# Patient Record
Sex: MALE | Employment: UNEMPLOYED | URBAN - METROPOLITAN AREA
[De-identification: names, ages, dates, MRNs, and addresses within clinical notes are randomized per-mention and may not be internally consistent; named-entity substitution may affect disease eponyms.]

---

## 2018-12-03 ENCOUNTER — OFFICE VISIT (OUTPATIENT)
Dept: ORTHOPEDIC SURGERY | Age: 33
End: 2018-12-03

## 2018-12-03 VITALS
HEIGHT: 74 IN | SYSTOLIC BLOOD PRESSURE: 131 MMHG | OXYGEN SATURATION: 98 % | DIASTOLIC BLOOD PRESSURE: 88 MMHG | RESPIRATION RATE: 16 BRPM | HEART RATE: 95 BPM | TEMPERATURE: 98.2 F

## 2018-12-03 DIAGNOSIS — M79.671 RIGHT FOOT PAIN: ICD-10-CM

## 2018-12-03 DIAGNOSIS — S92.311A CLOSED DISPLACED FRACTURE OF FIRST METATARSAL BONE OF RIGHT FOOT, INITIAL ENCOUNTER: Primary | ICD-10-CM

## 2018-12-03 RX ORDER — IBUPROFEN 800 MG/1
TABLET ORAL
COMMUNITY

## 2018-12-03 RX ORDER — TRAMADOL HYDROCHLORIDE 50 MG/1
50 TABLET ORAL
Qty: 48 TAB | Refills: 0 | Status: SHIPPED | OUTPATIENT
Start: 2018-12-03

## 2018-12-03 NOTE — PROGRESS NOTES
AMBULATORY PROGRESS NOTE Patient: Alyssa Jones             MRN: [id number]     SSN: [id number] There is no height or weight on file to calculate BMI. YOB: 1985     AGE: 34 y.o. EX: male PCP: No primary care provider on file. IMPRESSION/DIAGNOSIS AND TREATMENT PLAN  
 
DIAGNOSES 1. Closed displaced fracture of first metatarsal bone of right foot, initial encounter 2. Right foot pain Orders Placed This Encounter  [32244] Foot Min 3V  traMADol (ULTRAM) 50 mg tablet Alyssa Jones understands his diagnoses and the proposed plan. I had a lengthy discussion with him regarding his foot. He has a displaced right 1st metatarsal fracture, mid-diaphyseal region. The distal fracture fragment is displaced in the lateral direction by 4 mm. It seems to be interdigitated. There is no abnormal malalignment in the sagittal plane; this is what his x-ray show today, 3 views, right foot, [facility]. He has some swelling to his foot, does not have a positive wrinkle test, but has swelling to his right foot. Plan is to put it in a well-padded posterior splint, reassess him in 1 week with x-rays, and clinical exam to make sure swelling is going down. He understands that he may still require surgical intervention on this right 1st toe metatarsal.  We want to make sure it does not move at this point, so he needs close follow-up, but because of swelling he has, this would certainly preclude any type of surgical intervention at this current time. I explained to him that he may require surgery due to the lateral displacement. Plan: 
 
1) Splint placed in the office today. 2) Cut back on cigarette smoking. 3) Continue activity modification as directed. RTO - 1 week // PLEASE OBTAIN X-RAYS OF: right foot 3 VIEWS  
 HPI AND EXAMINATION Alyssa Jones IS A 34 y.o. male who presents to my outpatient office complaining of right foot pain. Mr. Brooks العلي states that sometime in November, he kicked his right foot against a chair on accident and fractured his foot. He reports that he has not been taking any medications for his pain. He denies any kidney, heart, or lung problems and states he is otherwise healthy. He denies any DM, sickle cell anemia, or marijuana use. The patient smokes half a pack of cigarettes a day. XR imaging has been reviewed with the patient. Possible surgical and non-surgical interventions have been discussed with the patient. Date of injury: 11/2018. Visit Vitals /88 Pulse 95 Temp 98.2 °F (36.8 °C) (Oral) Resp 16 Ht 6' 2\" (1.88 m) SpO2 98% Appearance: Alert, well appearing and pleasant patient who is in no distress, oriented to person, place/time, and who follows commands. Psychiatric: Affect and mood are appropriate. Cardiovascular/Peripheral Vascular: Normal Pulses to each hand and foot Musculoskeletal:  LOCATION:  Right FOOT/ANKLE Integumentary: No rashes, skin patches, wounds, or abrasions to the right or left legs Warm and normal color. No regions of expressible drainage. Moderate swelling to the dorsal forefoot. No wrinkle test at this time due to swelling. Gait: nonambulatory Tenderness: ATFL/CFL Anterolateral ankle ligaments tenderness is not present Anterior Syndesmosis is not present Medial deltoid ligament tenderness is not present Peroneal tendon sheath no swelling 1/2 Metatarsal base tenderness is present Midfoot tenderness is present Achilles tenderness is not present Cuboid tenderness is not present Proximal fibula tenderness: is not present Motor Strength/Tone Exam: Normal to the toes with respect to extension/flexion Sensory Exam:   Intact Normal Sensation to ankle/foot Stability Testing: Peroneal tendon instability : Not tested due to pain, Instability of Ankle/Subtalar: Not tested due to pain, ROM: Decreased ROM noted to ankle Decreased Hindfoot (Subtalar, CC, TN regions) Normal Forefoot toes Contractures: No Achilles or Gastrocnemius Contractures Calf tenderness: Absent for calf or gastrocnemius muscle regions Soft, supple, non tender, non taut lower extremity compartments Alignment: Neutral Hindfoot Wounds/Abrasions:   None present Extremities:   No embolic phenomena to the toes or hands No significant edema to the foot and or toes. Lower extremities are warm and appear well perfused DVT: No evidence of DVT seen on examination at this time No calf swelling, no tenderness to calf muscles Lymphatic:  No Evidence of Lymphedema Vascular: Medial Border of Tibia Region: Edema is not present Pulses: Dorsalis Pedis &  Posterior Tibial Pulses : Palpable yes Varicosities Lower Limbs :  None Neuro: Negative bilateral Straight leg raise (seated position) See Musculoskeletal section for pertinent individual extremity examination No abnormal hand/wrist, foot/ankle, or facial/neck tremors. CHART REVIEW No past medical history on file. Current Outpatient Medications Medication Sig  ibuprofen (MOTRIN) 800 mg tablet Take  by mouth.  traMADol (ULTRAM) 50 mg tablet Take 1 Tab by mouth every six (6) hours as needed for Pain. Max Daily Amount: 200 mg. No current facility-administered medications for this visit. Allergies Allergen Reactions  Vicodin [Hydrocodone-Acetaminophen] Rash No past surgical history on file. Social History Occupational History  Not on file Tobacco Use  Smoking status: Current Every Day Smoker  Smokeless tobacco: Never Used Substance and Sexual Activity  Alcohol use: Not on file  Drug use: Not on file  Sexual activity: Not on file No family history on file. REVIEW OF SYSTEMS : 12/3/2018  ALL BELOW ARE Negative except : SEE HPI Constitutional: Negative for fever, chills and weight loss. Neg Weight Loss Cardiovascular: Negative for chest pain, claudication and leg swelling. SOB, PALACIO Gastrointestinal/Urological: Negative for  pain, N/V/D/C, Blood in stool or urine,dysuria                         Hematuria, Incontinence, pelvic pain Musculoskeletal: see HPI. Neurological: Negative for dizziness and weakness, headaches,Visual Changes             Confusion,  Or Seizures, Psychiatric/Behavioral: Negative for depression, memory loss and substance abuse. Extremities:  Negative for hair changes, rash or skin lesion changes. Hematologic: Negative for Bleeding problems, bruising, pallor or swollen lymph nodes. Peripheral Vascular: No calf pain, vascular vein tenderness to calf pain No calf throbbing, posterior knee throbbing pain DIAGNOSTIC IMAGING No notes on file Please see above section of this report. I have personally reviewed the results of the above study. The interpretation of this study is my professional opinion. Written by Emperatriz Paula, as dictated by Dr. Annamaria Mac. I, Dr. Annamaria Mac, confirm that all documentation is accurate.

## 2018-12-03 NOTE — PATIENT INSTRUCTIONS
Please follow up in 1 week. You are advised to contact us if your condition worsens. Metatarsal Fracture: Care Instructions Your Care Instructions A metatarsal fracture is a break or a thin, hairline crack in one of the metatarsal bones of the foot. This type of fracture usually happens from repeated stress on the bones of the foot. Or it can happen when a person jumps or changes direction quickly and twists his or her foot or ankle the wrong way. 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 Treatment depends on how bad the fracture is and where the fracture is on the bone. You may or may not have had surgery. Your doctor may have put your foot in a cast or splint to keep it stable. You may have been given crutches to use to keep weight off your foot. A metatarsal fracture may take from 6 weeks to several months to heal. It is important to give your foot time to heal completely, so that you do not hurt it again. Do not return to your usual activities until your doctor says you can. Your doctor may suggest that you get physical therapy to help regain strength and range of motion in your foot. You heal best when you take good care of yourself. Eat a variety of healthy foods, and don't smoke. Follow-up care is a key part of your treatment and safety. Be sure to make and go to all appointments, and call your doctor if you are having problems. It is also a good idea to know your test results and keep a list of the medicines you take. How can you care for yourself at home? · Be safe with medicines. Read and follow all instructions on the label. ? If your doctor gave you a prescription medicine for pain, take it as prescribed. ? If you are not taking a prescription pain medicine, ask your doctor if you can take an over-the-counter medicine.  
· Follow your doctor's instructions about how much weight you can put on your foot and when you can go back to your usual activities. If you were given crutches, use them as directed. · Put ice or a cold pack on your foot for 10 to 20 minutes at a time. Try to do this every 1 to 2 hours for the next 3 days (when you are awake) or until the swelling goes down. Put a thin cloth between the ice and your skin. · Prop up your foot on a pillow when you ice it or anytime you sit or lie down for the next 3 days. Try to keep it above the level of your heart. This will help reduce swelling. Cast and splint care · If your foot is in a cast or splint, follow the cast or splint care instructions your doctor gives you. If you have a removable fiberglass walking cast or a splint, do not take it off unless your doctor tells you to. · Keep your cast or splint dry. If you have a removable fiberglass walking cast or a splint, ask your doctor if it is okay to remove it to bathe. Your doctor may want you to keep it on as much as possible. · If you're told to keep your cast or splint on, tape a sheet of plastic to cover it when you bathe. Or ask your doctor about products that can help keep a cast or splint dry. Water under the cast or splint can cause your skin to itch and hurt. · Never cut your cast or stick anything down it to scratch an itch on your leg. When should you call for help? Call your doctor now or seek immediate medical care if: 
  · You have problems with your cast or splint. For example: ? The skin under the cast or splint is burning or stinging. ? The cast or splint feels too tight. ? There is a lot of swelling near the cast or splint. (Some swelling is normal.) ? You have a new fever. ? There is drainage or a bad smell coming from the cast or splint.  
  · You have increased or severe pain.  
  · You have tingling, weakness, or numbness in your foot and toes.  
  · You cannot move your toes.  
  · Your foot turns cold or changes color.  Watch closely for changes in your health, and be sure to contact your doctor if: 
  · The pain does not get better day by day.  
  · You do not get better as expected. Where can you learn more? Go to http://bertin-anna.info/. Enter (742) 2020-075 in the search box to learn more about \"Metatarsal Fracture: Care Instructions. \" Current as of: November 29, 2017 Content Version: 11.8 © 2965-4199 [employer]. Care instructions adapted under license by [employer] (which disclaims liability or warranty for this information). If you have questions about a medical condition or this instruction, always ask your healthcare professional. [address] any warranty or liability for your use of this information.

## 2018-12-10 ENCOUNTER — OFFICE VISIT (OUTPATIENT)
Dept: ORTHOPEDIC SURGERY | Age: 33
End: 2018-12-10

## 2018-12-10 VITALS
SYSTOLIC BLOOD PRESSURE: 138 MMHG | HEART RATE: 80 BPM | OXYGEN SATURATION: 98 % | RESPIRATION RATE: 16 BRPM | TEMPERATURE: 98.7 F | DIASTOLIC BLOOD PRESSURE: 96 MMHG | HEIGHT: 74 IN

## 2018-12-10 DIAGNOSIS — M79.671 RIGHT FOOT PAIN: Primary | ICD-10-CM

## 2018-12-10 DIAGNOSIS — S92.311D CLOSED DISPLACED FRACTURE OF FIRST METATARSAL BONE OF RIGHT FOOT WITH ROUTINE HEALING, SUBSEQUENT ENCOUNTER: ICD-10-CM

## 2018-12-10 DIAGNOSIS — Z01.818 PRE-OP EXAM: ICD-10-CM

## 2018-12-10 RX ORDER — OXYCODONE AND ACETAMINOPHEN 5; 325 MG/1; MG/1
1 TABLET ORAL
Qty: 40 TAB | Refills: 0 | Status: SHIPPED | OUTPATIENT
Start: 2018-12-10

## 2018-12-10 RX ORDER — HYDROCODONE BITARTRATE AND ACETAMINOPHEN 5; 325 MG/1; MG/1
1 TABLET ORAL
Qty: 40 TAB | Refills: 0 | Status: CANCELLED | OUTPATIENT
Start: 2018-12-10

## 2018-12-10 NOTE — H&P
FOOT AND ANKLE HISTORY AND PHYSICAL Patient: Heather Raphael                    MRN: [id number]         SSN: [id number] YOB: 1985                  AGE: 34 y.o. SEX: male Patient scheduled for:  Open reduction internal fixation left first metatarsal fracture, possible bone grafting Date of surgery: 12/14/18 [facility]geon: Dorcas Lilly. MD Barnt 
ANESTHESIA TYPE:  General,  Popliteal block  
  
 
 
PRESCRIPTIONS AND/OR ORDERS PROVIDED DURING H&P: 
 
Orders Placed This Encounter  AMB SUPPLY ORDER  
 CULTURE, URINE  
 [74598] Foot Min 3V  XR CHEST PA LAT  CBC WITH AUTOMATED DIFF  
 METABOLIC PANEL, COMPREHENSIVE  
 PROTHROMBIN TIME + INR  
 URINALYSIS W/ RFLX MICROSCOPIC  DRUG SCREEN UR - W/ CONFIRM  
 NICOTINE AND METABOLITE, QT SERUM, PLASMA, WHOLE BLOOD  EKG, 12 LEAD, INITIAL  oxyCODONE-acetaminophen (PERCOCET) 5-325 mg per tablet  
  
  
  
 
HISTORY:  
 
The patient was seen in the office today for a preoperative history and physical for an upcoming above listed surgery. The patient is a pleasant 34 y.o. male who has a history of right foot pain. Mr. Cohen  states that sometime in November, he kicked his right foot against a chair on accident and fractured his foot. displaced right 1st metatarsal fracture, mid-diaphyseal region. The distal fracture fragment is displaced in the lateral direction by 4 mm. It seems to be interdigitated. There is no abnormal malalignment in the sagittal plane. He has some swelling to his foot, does not have a positive wrinkle test, but has swelling to his right foot. 
  
Due to the current findings, affected activity of daily living and continued pain and discomfort, surgical intervention is indicated.  The alternatives, risks, and complications, including but not limited to infection, blood loss, need for blood transfusion, neurovascular damage, karime-incisional numbness, subcutaneous hematoma, bone fracture, anesthetic complications, DVT, PE, death, RSD, postoperative stiffness and pain, possible surgical scar, delayed healing and nonhealing, reflexive sympathetic dystrophy, damage to blood vessels and nerves, need for more surgery, MI, and stroke, failure of hardware, gait disturbances,  have been discussed. The patient understands and wishes to proceed with surgery. PAST MEDICAL HISTORY:  
 
Past Medical History:  
Diagnosis Date  Body piercing Tongue  Cough CURRENT MEDICATIONS:  
 
Current Outpatient Medications Medication Sig Dispense Refill  oxyCODONE-acetaminophen (PERCOCET) 5-325 mg per tablet Take 1 Tab by mouth three (3) times daily as needed for Pain. Max Daily Amount: 3 Tabs. 40 Tab 0  ibuprofen (MOTRIN) 800 mg tablet Take  by mouth.  traMADol (ULTRAM) 50 mg tablet Take 1 Tab by mouth every six (6) hours as needed for Pain. Max Daily Amount: 200 mg. 48 Tab 0 ALLERGIES:  
 
Allergies Allergen Reactions  Vicodin [Hydrocodone-Acetaminophen] Rash SURGICAL HISTORY:  
 
Past Surgical History:  
Procedure Laterality Date  HX OTHER SURGICAL Right   
 closure of right knee laceration as child SOCIAL HISTORY:  
 
Social History Socioeconomic History  Marital status: UNKNOWN Spouse name: Not on file  Number of children: Not on file  Years of education: Not on file  Highest education level: Not on file Tobacco Use  Smoking status: Current Every Day Smoker Packs/day: 0.25  Smokeless tobacco: Never Used Substance and Sexual Activity  Drug use: No  
 
 
FAMILY HISTORY:  
 
History reviewed. No pertinent family history. REVIEW OF SYSTEMS:  
 
Negative for fevers, chills, chest pain, shortness of breath, weight loss, recent illness General: Negative for fever and chills. No unexpected change in weight. Denies fatigue. No change in appetite. Skin: Negative for rash or itching. HEENT: Negative for congestion, sore throat, neck pain and neck stiffness. No change in vision or hearing. Hasn't noted any enlarged lymph nodes in the neck. Cardiovascular:  Negative for chest pain and palpitations. Has not noted pedal edema. Respiratory: Negative for colds, sinus, hemoptysis, shortness of breath and wheezing. Positive for cough. Gastrointestinal: Negative for nausea and vomiting, rectal bleeding, coffee ground emesis, abdominal pain, diarrhea and constipation. Genitourinary: Negative for dysuria, frequency urgency, or burning on micturition. No flank pain, no foul smelling urine, no difficulty with initiating urination. Hematological: Negative for bleeding or easy bruising. Musculoskeletal: Negative  for arthralgias, back pain or neck pain. Neurological: Negative for dizziness, seizures or syncopal episodes. Denies headaches. Endocrine: Denies excessive thirst.  No heat/cold intolerance. Psychiatric: Negative for depression or insomnia. PHYSICAL EXAMINATION:  
 
VITALS:  
Visit Vitals BP (!) 138/96 Pulse 80 Temp 98.7 °F (37.1 °C) (Oral) Resp 16 Ht 6' 2\" (1.88 m) SpO2 98% GEN:  Well developed, well nourished 34 y.o. male in no acute distress. PSYCH: Alert an oriented to person, place and time. Mood, memory, affect, behavior and judgment normal 
HEENT: Normocephalic and atraumatic. Eyes: Conjunctivae and EOM are normal.Pupils are equal, round, and reactive to light. External ear normal appearance, external nose normal appearing. Mouth/Throat: Oropharynx is clear and moist, able to handle oral secretions w/out difficulty, airway patent NECK: Supple. Normal ROM, No lymphadenopathy. Trachea is midline. No bruising, swelling or deformity RESP: Clear to auscultation bilaterally. No wheezes, rales, rhonchi. Normal effort and breath sounds. No respiratory distress CARDIO: Normal rate, regular rhythm and normal heart sounds. No MGR. ABDOMEN: Soft, non-tender, non-distended, normoactive bowel sounds in all four quadrants. There is no tenderness. There is no rebound and no guarding. BACK: No CVA or spinal tenderness BREAST:  Deferred PELVIC:    Deferred RECTAL:  Deferred :           Deferred EXTREMITIES: EXAMINATION OF:  Right FOOT/ANKLE Integumentary: No rashes, skin patches, wounds, or abrasions to the right or left legs Warm and normal color. No regions of expressible drainage. Moderate swelling to the dorsal forefoot. No wrinkle test at this time due to swelling. Gait: nonambulatory Tenderness: ATFL/CFL Anterolateral ankle ligaments tenderness is not present Anterior Syndesmosis is not present Medial deltoid ligament tenderness is not present Peroneal tendon sheath no swelling 1/2 Metatarsal base tenderness is present Midfoot tenderness is present Achilles tenderness is not present Cuboid tenderness is not present Proximal fibula tenderness: is not present Motor Strength/Tone Exam: Normal to the toes with respect to extension/flexion Sensory Exam:   Intact Normal Sensation to ankle/foot Stability Testing: Peroneal tendon instability : Not tested due to pain, Instability of Ankle/Subtalar: Not tested due to pain, ROM: Decreased ROM noted to ankle Decreased Hindfoot (Subtalar, CC, TN regions) Normal Forefoot toes Contractures: No Achilles or Gastrocnemius Contractures Calf tenderness: Absent for calf or gastrocnemius muscle regions Soft, supple, non tender, non taut lower extremity compartments Alignment: Neutral Hindfoot Wounds/Abrasions:   None present Extremities:   No embolic phenomena to the toes or hands No significant edema to the foot and or toes. Lower extremities are warm and appear well perfused DVT: No evidence of DVT seen on examination at this time No calf swelling, no tenderness to calf muscles Lymphatic:  No Evidence of Lymphedema Vascular: Medial Border of Tibia Region: Edema is not present Pulses: Dorsalis Pedis &  Posterior Tibial Pulses : Palpable yes Varicosities Lower Limbs :  None Neuro: Negative bilateral Straight leg raise (seated position) See Musculoskeletal section for pertinent individual extremity examination No abnormal hand/wrist, foot/ankle, or facial/neck tremors. RADIOGRAPHS & DIAGNOSTIC STUDIES:  
 
X-rays of the right foot reveal a fracture to the first metatarsal 
 
LABS:  
Pending ASSESSMENT:  
 
 
Encounter Diagnoses Name Primary?  Right foot pain Yes  Closed displaced fracture of first metatarsal bone of right foot with routine healing, subsequent encounter  Pre-op exam   
 
 
PLAN:  
 
Again, the alternatives, risks, and complications, as well as expected outcome were discussed. The patient understands and agrees to proceed with the above listed surgery pending completion of preoperative labs and diagnostic studies. Patient has been given Hibiclens wash with instructions and prescriptions and or orders listed above.  
 
Ct Triplett PA-C 
12/10/2018 
9:24 AM

## 2018-12-10 NOTE — PATIENT INSTRUCTIONS
Contact Maxi Nash for surgical scheduling. Metatarsal Fracture: Care Instructions Your Care Instructions A metatarsal fracture is a break or a thin, hairline crack in one of the metatarsal bones of the foot. This type of fracture usually happens from repeated stress on the bones of the foot. Or it can happen when a person jumps or changes direction quickly and twists his or her foot or ankle the wrong way. 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 Treatment depends on how bad the fracture is and where the fracture is on the bone. You may or may not have had surgery. Your doctor may have put your foot in a cast or splint to keep it stable. You may have been given crutches to use to keep weight off your foot. A metatarsal fracture may take from 6 weeks to several months to heal. It is important to give your foot time to heal completely, so that you do not hurt it again. Do not return to your usual activities until your doctor says you can. Your doctor may suggest that you get physical therapy to help regain strength and range of motion in your foot. You heal best when you take good care of yourself. Eat a variety of healthy foods, and don't smoke. Follow-up care is a key part of your treatment and safety. Be sure to make and go to all appointments, and call your doctor if you are having problems. It is also a good idea to know your test results and keep a list of the medicines you take. How can you care for yourself at home? · Be safe with medicines. Read and follow all instructions on the label. ? If your doctor gave you a prescription medicine for pain, take it as prescribed. ? If you are not taking a prescription pain medicine, ask your doctor if you can take an over-the-counter medicine.  
· Follow your doctor's instructions about how much weight you can put on your foot and when you can go back to your usual activities. If you were given crutches, use them as directed. · Put ice or a cold pack on your foot for 10 to 20 minutes at a time. Try to do this every 1 to 2 hours for the next 3 days (when you are awake) or until the swelling goes down. Put a thin cloth between the ice and your skin. · Prop up your foot on a pillow when you ice it or anytime you sit or lie down for the next 3 days. Try to keep it above the level of your heart. This will help reduce swelling. Cast and splint care · If your foot is in a cast or splint, follow the cast or splint care instructions your doctor gives you. If you have a removable fiberglass walking cast or a splint, do not take it off unless your doctor tells you to. · Keep your cast or splint dry. If you have a removable fiberglass walking cast or a splint, ask your doctor if it is okay to remove it to bathe. Your doctor may want you to keep it on as much as possible. · If you're told to keep your cast or splint on, tape a sheet of plastic to cover it when you bathe. Or ask your doctor about products that can help keep a cast or splint dry. Water under the cast or splint can cause your skin to itch and hurt. · Never cut your cast or stick anything down it to scratch an itch on your leg. When should you call for help? Call your doctor now or seek immediate medical care if: 
  · You have problems with your cast or splint. For example: ? The skin under the cast or splint is burning or stinging. ? The cast or splint feels too tight. ? There is a lot of swelling near the cast or splint. (Some swelling is normal.) ? You have a new fever. ? There is drainage or a bad smell coming from the cast or splint.  
  · You have increased or severe pain.  
  · You have tingling, weakness, or numbness in your foot and toes.  
  · You cannot move your toes.  
  · Your foot turns cold or changes color.  Watch closely for changes in your health, and be sure to contact your doctor if: 
  · The pain does not get better day by day.  
  · You do not get better as expected. Where can you learn more? Go to http://bertin-anna.info/. Enter ([phone] 7335-484 in the search box to learn more about \"Metatarsal Fracture: Care Instructions. \" Current as of: November 29, 2017 Content Version: 11.8 © 4254-5927 [employer]. Care instructions adapted under license by [employer] (which disclaims liability or warranty for this information). If you have questions about a medical condition or this instruction, always ask your healthcare professional. [address] any warranty or liability for your use of this information.

## 2018-12-10 NOTE — LETTER
Patient: Rocio Johnson PROCEDURE: ORIF Right First Metatarsal Possible Bone Graft PRE OPERATIVE INSTRUCTIONS: 
Five (5) days prior to surgery STOP taking any hormonal medications, aspirin and/or anti-inflammatory medications. If you are taking blood thinner medication (such as Coumadin, Plavix, Heparin or others) you will need special  instructions from the prescribing physician. Report to the Lab  At [facility] with your enclosed order Form on 12/10/18 You may not eat or drink anything after midnight the night before. Surgery Date: 12/14/18  Time: 9:30am 
Report to [facility] on the First Floor Admitting at: 7:30am 
 
THE DAY OF SURGERY:  
      1. Do not eat, chew gum or drink anything after Midnight prior to the date of your surgery. 2. Take your regular medications with small sips of water unless otherwise instructed. (This means blood pressure and/or heart medicine) If you are insulin dependent, bring your insulin with you, unless otherwise instructed. 3. Bring a list of your medications and the dosage to the hospital including  vitamins. 4. Do not wear nail polish, make-up, jewelry, perfumes or Skin Creams. 5. Do not bring valuables or money to the hospital. 
      6. Must have a responsible adult to accompany you and stay during your surgery so they may drive you home following your surgery and stay with you 24 hours after surgery. Post op visit  appointment is scheduled with Guerline KIMBLE   
   on 12/19/18 @ 1:30pm  at the  [facility] office. Kodi Rocha, Surgery Scheduler  [phone]

## 2018-12-10 NOTE — PROGRESS NOTES
AMBULATORY PROGRESS NOTE Patient: Brandie Sorto             MRN: [id number]     SSN: [id number] There is no height or weight on file to calculate BMI. YOB: 1985     AGE: 34 y.o. EX: male PCP: No primary care provider on file. IMPRESSION/DIAGNOSIS AND TREATMENT PLAN  
 
DIAGNOSES 1. Right foot pain 2. Closed displaced fracture of first metatarsal bone of right foot with routine healing, subsequent encounter 3. Pre-op exam   
 
 
Orders Placed This Encounter  [46386] Foot Min 3V Brandie Sorto understands his diagnoses and the proposed plan. IMPRESSION:  First metatarsal fracture, right side. There is some translation in the coronal plane. There is some slight loss of height. It looks like there is some bony formation. I do not appreciate any significant sagittal plane instability. He arrives here in a splint. It is dirty. He says he has been putting his foot down or at least his heel down. I had a lengthy discussion with him regarding his fracture. Recommendation is for stabilization of the fracture to help minimize the risk of it moving. What we do not tolerate well is any type of sagittal plane instability or sagittal plane malalignment. We can accept some shortening but certainly, we want his first ray to be as anatomic in length and anatomic as possible to help minimize transverse metatarsalgia-type problems as well. The risks of surgery were explained in full detail. These risks include, but are not limited to, bleeding, infection, DVT, PE, death, RSD, karime-incisional numbness, subcutaneous hematoma, post-surgical scar, possible delayed healing, nonhealing, and possible wound complications. The patient voices understanding of the risks of surgery and an informed decision is made to proceed. He understands he must be compliant with postoperative care to minimize the risk for complications. Plan: 1) Continue cutting back on cigarette smoking as directed. 2) DME Order: Short right CAM walker boot. 3) Use cryotherapy as directed. 4) Remain NWB to the RLE in the CAM walker boot. 5) Continue activity modification as directed. 6) Percocet 5 m-2 PO TIDPRN; 40 tablets, 0 refills. The patient states that he has taken Percocet in the past and that it did not cause itching or rash. 7) Pre-Op Labs: CBC w/ diff, CMP, PT INR, CXR, EKG, UA w/ reflex, Urine Culture, UDS, Nicotine level screen. 8) Contact Vi Mclean for surgical scheduling. RTO - after contacting [facility] AND EXAMINATION Greta Welch IS A 34 y.o. male who presents to my outpatient office for follow up of a closed displaced fracture of the first metatarsal bone of the right foot. At the last visit, I prescribed Tramadol 50 mg, placed a splint in the office, instructed the patient to continue activity modification as directed, and to cut back on cigarette smoking. Date of injury: 2018. Since we saw him last, Mr. Marquise Fields states that he is still experiencing a significant amount of pain in his right 1st metatarsal. He reports that he has cut down smoking from half a pack to a quarter of a pack. XR imaging has been reviewed with the patient. Possible surgical interventions have been discussed with the patient. The patient denies any kidney, lung, or heart diseases. He denies h/o DM. Visit Vitals BP (!) 138/96 Pulse 80 Temp 98.7 °F (37.1 °C) (Oral) Resp 16 Ht 6' 2\" (1.88 m) SpO2 98% Appearance: Alert, well appearing and pleasant patient who is in no distress, oriented to person, place/time, and who follows commands. Psychiatric: Affect and mood are appropriate. Cardiovascular/Peripheral Vascular: Normal Pulses to each hand and foot Musculoskeletal:  LOCATION:  Right FOOT/ANKLE Integumentary: No rashes, skin patches, wounds, or abrasions to the right or left legs Warm and normal color. No regions of expressible drainage. Moderate swelling to the dorsal forefoot. No wrinkle test at this time due to swelling. Gait: nonambulatory Tenderness: ATFL/CFL Anterolateral ankle ligaments tenderness is not present Anterior Syndesmosis is not present Medial deltoid ligament tenderness is not present Peroneal tendon sheath no swelling 1/2 Metatarsal base tenderness is present Midfoot tenderness is present  (1st TMT [employer]) Achilles tenderness is not present Cuboid tenderness is not present Proximal fibula tenderness: is not present Motor Strength/Tone Exam: Normal to the toes with respect to extension/flexion Sensory Exam:   Intact Normal Sensation to ankle/foot Stability Testing: Peroneal tendon instability : Not tested due to pain, Instability of Ankle/Subtalar: Not tested due to pain, ROM: Decreased ROM noted to ankle Decreased Hindfoot (Subtalar, CC, TN regions) Normal Forefoot toes Contractures: No Achilles or Gastrocnemius Contractures Calf tenderness: Absent for calf or gastrocnemius muscle regions Soft, supple, non tender, non taut lower extremity compartments Alignment: Neutral Hindfoot Wounds/Abrasions:   None present Extremities:   No embolic phenomena to the toes or hands No significant edema to the foot and or toes. Lower extremities are warm and appear well perfused DVT: No evidence of DVT seen on examination at this time No calf swelling, no tenderness to calf muscles Lymphatic:  No Evidence of Lymphedema Vascular: Medial Border of Tibia Region: Edema is not present Pulses: Dorsalis Pedis &  Posterior Tibial Pulses : Palpable yes Varicosities Lower Limbs :  None Neuro: Negative bilateral Straight leg raise (seated position) See Musculoskeletal section for pertinent individual extremity examination No abnormal hand/wrist, foot/ankle, or facial/neck tremors. CHART REVIEW No past medical history on file. Current Outpatient Medications Medication Sig  ibuprofen (MOTRIN) 800 mg tablet Take  by mouth.  traMADol (ULTRAM) 50 mg tablet Take 1 Tab by mouth every six (6) hours as needed for Pain. Max Daily Amount: 200 mg. No current facility-administered medications for this visit. Allergies Allergen Reactions  Vicodin [Hydrocodone-Acetaminophen] Rash No past surgical history on file. Social History Occupational History  Not on file Tobacco Use  Smoking status: Current Every Day Smoker  Smokeless tobacco: Never Used Substance and Sexual Activity  Alcohol use: Not on file  Drug use: Not on file  Sexual activity: Not on file No family history on file. REVIEW OF SYSTEMS : 12/10/2018  ALL BELOW ARE Negative except : SEE HPI Constitutional: Negative for fever, chills and weight loss. Neg Weight Loss Cardiovascular: Negative for chest pain, claudication and leg swelling. SOB, PALACIO Gastrointestinal/Urological: Negative for  pain, N/V/D/C, Blood in stool or urine,dysuria                         Hematuria, Incontinence, pelvic pain Musculoskeletal: see HPI. Neurological: Negative for dizziness and weakness, headaches,Visual Changes             Confusion,  Or Seizures, Psychiatric/Behavioral: Negative for depression, memory loss and substance abuse. Extremities:  Negative for hair changes, rash or skin lesion changes. Hematologic: Negative for Bleeding problems, bruising, pallor or swollen lymph nodes. Peripheral Vascular: No calf pain, vascular vein tenderness to calf pain No calf throbbing, posterior knee throbbing pain DIAGNOSTIC IMAGING No notes on file Please see above section of this report. I have personally reviewed the results of the above study. The interpretation of this study is my professional opinion. Written by Isabela Phillips, as dictated by Dr. Cindy Lee. I, Dr. Cindy Lee, confirm that all documentation is accurate.

## 2018-12-10 NOTE — LETTER
[address] [facility] Request Form for the Operating Room at [facility] Fax to [phone]                                        Telephone: [phone] or [phone] To be completed by Physician Office: 
 
Date: 12/10/2018    Requested by: Ember Saenz Phone No: [phone]  Fax No:  [phone] Surgery Date: 18    Requested Time: Second Case Surgeon: Richar Hauser MD Assistant/2nd Surgeon: Rosa Godwin PA-C 
 
CPT CODE Procedure 51671 ORIF Right First Metatarsal  
[address]398 Bone Grafting ICD10 code(s): Right First Metatarsal FX [id number] Patient Information: 
 
Name: Birdie Bermudez SSN: [id number]  : 1985  Male/Female: male Home Phone No: [phone] (home) Primary Insurance: Self Pay   Insurance Policy Number:   
Allergies: Allergies Allergen Reactions  Vicodin [Hydrocodone-Acetaminophen] Rash Admission:  Outpatient Anesthesia Type General w/Nerve Block for Post-op Pain Control Comments/Special Equipment and/or :[place] 28 Plate System  LARGE C-Arm   

## 2018-12-12 ENCOUNTER — HOSPITAL ENCOUNTER (OUTPATIENT)
Dept: GENERAL RADIOLOGY | Age: 33
Discharge: HOME | End: 2018-12-12
Payer: SELF-PAY

## 2018-12-12 ENCOUNTER — HOSPITAL ENCOUNTER (OUTPATIENT)
Dept: LAB | Age: 33
Discharge: HOME | End: 2018-12-12
Payer: SELF-PAY

## 2018-12-12 DIAGNOSIS — S92.312A DISPLACED FRACTURE OF FIRST METATARSAL BONE, LEFT FOOT, INITIAL ENCOUNTER FOR CLOSED FRACTURE: Primary | ICD-10-CM

## 2018-12-12 DIAGNOSIS — Z01.818 PRE-OP EXAM: ICD-10-CM

## 2018-12-12 LAB
ALBUMIN SERPL-MCNC: 4.5 G/DL (ref 3.4–5)
ALBUMIN/GLOB SERPL: 1.3 {RATIO} (ref 0.8–1.7)
ALP SERPL-CCNC: 133 U/L (ref 45–117)
ALT SERPL-CCNC: 37 U/L (ref 16–61)
ANION GAP SERPL CALC-SCNC: 8 MMOL/L (ref 3–18)
AST SERPL-CCNC: 34 U/L (ref 15–37)
BASOPHILS # BLD: 0.1 K/UL (ref 0–0.1)
BASOPHILS NFR BLD: 1 % (ref 0–2)
BILIRUB SERPL-MCNC: 0.6 MG/DL (ref 0.2–1)
BUN SERPL-MCNC: 10 MG/DL (ref 7–18)
BUN/CREAT SERPL: 11 (ref 12–20)
CALCIUM SERPL-MCNC: 9.2 MG/DL (ref 8.5–10.1)
CHLORIDE SERPL-SCNC: 101 MMOL/L (ref 100–108)
CO2 SERPL-SCNC: 28 MMOL/L (ref 21–32)
CREAT SERPL-MCNC: 0.89 MG/DL (ref 0.6–1.3)
DIFFERENTIAL METHOD BLD: ABNORMAL
EOSINOPHIL # BLD: 0.7 K/UL (ref 0–0.4)
EOSINOPHIL NFR BLD: 7 % (ref 0–5)
ERYTHROCYTE [DISTWIDTH] IN BLOOD BY AUTOMATED COUNT: 13 % (ref 11.6–14.5)
GLOBULIN SER CALC-MCNC: 3.6 G/DL (ref 2–4)
GLUCOSE SERPL-MCNC: 74 MG/DL (ref 74–99)
HCT VFR BLD AUTO: 46.7 % (ref 36–48)
HGB BLD-MCNC: 15.5 G/DL (ref 13–16)
INR PPP: 0.9 (ref 0.8–1.2)
LYMPHOCYTES # BLD: 2.8 K/UL (ref 0.9–3.6)
LYMPHOCYTES NFR BLD: 25 % (ref 21–52)
MCH RBC QN AUTO: 27.2 PG (ref 24–34)
MCHC RBC AUTO-ENTMCNC: 33.2 G/DL (ref 31–37)
MCV RBC AUTO: 82.1 FL (ref 74–97)
MONOCYTES # BLD: 0.9 K/UL (ref 0.05–1.2)
MONOCYTES NFR BLD: 8 % (ref 3–10)
NEUTS SEG # BLD: 6.6 K/UL (ref 1.8–8)
NEUTS SEG NFR BLD: 59 % (ref 40–73)
PLATELET # BLD AUTO: 286 K/UL (ref 135–420)
PMV BLD AUTO: 8.9 FL (ref 9.2–11.8)
POTASSIUM SERPL-SCNC: 4.6 MMOL/L (ref 3.5–5.5)
PROT SERPL-MCNC: 8.1 G/DL (ref 6.4–8.2)
PROTHROMBIN TIME: 11.8 SEC (ref 11.5–15.2)
RBC # BLD AUTO: 5.69 M/UL (ref 4.7–5.5)
SODIUM SERPL-SCNC: 137 MMOL/L (ref 136–145)
WBC # BLD AUTO: 11 K/UL (ref 4.6–13.2)

## 2018-12-12 PROCEDURE — 80053 COMPREHEN METABOLIC PANEL: CPT

## 2018-12-12 PROCEDURE — 80353 DRUG SCREENING COCAINE: CPT

## 2018-12-12 PROCEDURE — 80323 ALKALOIDS NOS: CPT

## 2018-12-12 PROCEDURE — 85610 PROTHROMBIN TIME: CPT

## 2018-12-12 PROCEDURE — 71046 X-RAY EXAM CHEST 2 VIEWS: CPT

## 2018-12-12 PROCEDURE — 93005 ELECTROCARDIOGRAM TRACING: CPT

## 2018-12-12 PROCEDURE — 82542 COL CHROMOTOGRAPHY QUAL/QUAN: CPT

## 2018-12-12 PROCEDURE — 85025 COMPLETE CBC W/AUTO DIFF WBC: CPT

## 2018-12-12 PROCEDURE — 36415 COLL VENOUS BLD VENIPUNCTURE: CPT

## 2018-12-12 PROCEDURE — 80307 DRUG TEST PRSMV CHEM ANLYZR: CPT

## 2018-12-13 LAB
ATRIAL RATE: 96 BPM
CALCULATED P AXIS, ECG09: 83 DEGREES
CALCULATED R AXIS, ECG10: 78 DEGREES
CALCULATED T AXIS, ECG11: 59 DEGREES
DIAGNOSIS, 93000: NORMAL
P-R INTERVAL, ECG05: 128 MS
Q-T INTERVAL, ECG07: 338 MS
QRS DURATION, ECG06: 86 MS
QTC CALCULATION (BEZET), ECG08: 427 MS
VENTRICULAR RATE, ECG03: 96 BPM

## 2018-12-15 LAB
COTININE SERPL-MCNC: 482.5 NG/ML
NICOTINE SERPL-MCNC: 25.9 NG/ML

## 2018-12-18 LAB
AMPHET UR QL SCN: NEGATIVE
BARBITURATES UR QL SCN: NEGATIVE
BENZODIAZ UR QL: NEGATIVE
BZE UR CFM-MCNC: 4645 NG/ML
BZE UR QL: POSITIVE
CANNABINOIDS UR QL CFM: POSITIVE
CANNABINOIDS UR QL SCN: POSITIVE
COCAINE UR QL SCN: POSITIVE
HDSCOM,HDSCOM: ABNORMAL
METHADONE UR QL: NEGATIVE
OPIATES UR QL: NEGATIVE
PCP UR QL: NEGATIVE
THC UR CFM-MCNC: 114 NG/ML

## 2018-12-19 ENCOUNTER — OFFICE VISIT (OUTPATIENT)
Dept: ORTHOPEDIC SURGERY | Age: 33
End: 2018-12-19

## 2018-12-19 VITALS
TEMPERATURE: 97.9 F | RESPIRATION RATE: 15 BRPM | WEIGHT: 215 LBS | HEART RATE: 76 BPM | SYSTOLIC BLOOD PRESSURE: 125 MMHG | OXYGEN SATURATION: 99 % | HEIGHT: 74 IN | DIASTOLIC BLOOD PRESSURE: 87 MMHG | BODY MASS INDEX: 27.59 KG/M2

## 2018-12-19 DIAGNOSIS — S92.311D CLOSED DISPLACED FRACTURE OF FIRST METATARSAL BONE OF RIGHT FOOT WITH ROUTINE HEALING, SUBSEQUENT ENCOUNTER: ICD-10-CM

## 2018-12-19 DIAGNOSIS — M79.671 RIGHT FOOT PAIN: Primary | ICD-10-CM

## 2018-12-19 NOTE — PROGRESS NOTES
HISTORY:     The patient was seen in the office today for a preoperative history and physical for an upcoming above listed surgery. The patient is a pleasant 34 y.o. male who has a history of right foot pain. Mr. Lamonte Anderson states that sometime in November, he kicked his right foot against a chair on accident and fractured his foot. displaced right 1st metatarsal fracture, mid-diaphyseal region. The distal fracture fragment is displaced in the lateral direction by 4 mm. It seems to be interdigitated. There is no abnormal malalignment in the sagittal plane. He has some swelling to his foot, does not have a positive wrinkle test, but has swelling to his right foot. Patients surgery was initially scheduled for 12/14/18, and was cancelled due to +UDS. Patients x-rays completed in the office today show signs of healing. If alignment is maintained and if healing continues, we will continue to treat fracture non-operatively. We will have patient return on 1/3/19 for repeat x-rays and make final determination at that time. Otherwise, we will proceed with ORIF surgery on 1/4/19.     Due to the current findings, affected activity of daily living and continued pain and discomfort, surgical intervention is indicated. The alternatives, risks, and complications, including but not limited to infection, blood loss, need for blood transfusion, neurovascular damage, karime-incisional numbness, subcutaneous hematoma, bone fracture, anesthetic complications, DVT, PE, death, RSD, postoperative stiffness and pain, possible surgical scar, delayed healing and nonhealing, reflexive sympathetic dystrophy, damage to blood vessels and nerves, need for more surgery, MI, and stroke, failure of hardware, gait disturbances,  have been discussed. The patient understands and wishes to proceed with surgery.      PAST MEDICAL HISTORY:     Past Medical History:   Diagnosis Date    Body piercing     Tongue    Cough        CURRENT MEDICATIONS: Current Outpatient Medications   Medication Sig Dispense Refill    oxyCODONE-acetaminophen (PERCOCET) 5-325 mg per tablet Take 1 Tab by mouth three (3) times daily as needed for Pain. Max Daily Amount: 3 Tabs. 40 Tab 0    ibuprofen (MOTRIN) 800 mg tablet Take  by mouth.  traMADol (ULTRAM) 50 mg tablet Take 1 Tab by mouth every six (6) hours as needed for Pain. Max Daily Amount: 200 mg. 48 Tab 0       ALLERGIES:     Allergies   Allergen Reactions    Vicodin [Hydrocodone-Acetaminophen] Rash         SURGICAL HISTORY:     Past Surgical History:   Procedure Laterality Date    HX OTHER SURGICAL Right     closure of right knee laceration as child       SOCIAL HISTORY:     Social History     Socioeconomic History    Marital status: UNKNOWN     Spouse name: Not on file    Number of children: Not on file    Years of education: Not on file    Highest education level: Not on file   Tobacco Use    Smoking status: Current Every Day Smoker     Packs/day: 0.25    Smokeless tobacco: Never Used   Substance and Sexual Activity    Drug use: No       FAMILY HISTORY:     History reviewed. No pertinent family history. REVIEW OF SYSTEMS:     Negative for fevers, chills, chest pain, shortness of breath, weight loss, recent illness     General: Negative for fever and chills. No unexpected change in weight. Denies fatigue. No change in appetite. Skin: Negative for rash or itching. HEENT: Negative for congestion, sore throat, neck pain and neck stiffness. No change in vision or hearing. Hasn't noted any enlarged lymph nodes in the neck. Cardiovascular:  Negative for chest pain and palpitations. Has not noted pedal edema. Respiratory: Negative for colds, sinus, hemoptysis, shortness of breath and wheezing. Positive for cough. Gastrointestinal: Negative for nausea and vomiting, rectal bleeding, coffee ground emesis, abdominal pain, diarrhea and constipation.    Genitourinary: Negative for dysuria, frequency urgency, or burning on micturition. No flank pain, no foul smelling urine, no difficulty with initiating urination. Hematological: Negative for bleeding or easy bruising. Musculoskeletal: Negative  for arthralgias, back pain or neck pain. Neurological: Negative for dizziness, seizures or syncopal episodes. Denies headaches. Endocrine: Denies excessive thirst.  No heat/cold intolerance. Psychiatric: Negative for depression or insomnia. PHYSICAL EXAMINATION:     VITALS:   Visit Vitals  /87   Pulse 76   Temp 97.9 °F (36.6 °C) (Oral)   Resp 15   Ht 6' 2\" (1.88 m)   Wt 215 lb (97.5 kg)   SpO2 99%   BMI 27.60 kg/m²       GEN:  Well developed, well nourished 34 y.o. male in no acute distress. PSYCH: Alert an oriented to person, place and time. Mood, memory, affect, behavior and judgment normal  HEENT: Normocephalic and atraumatic. Eyes: Conjunctivae and EOM are normal.Pupils are equal, round, and reactive to light. External ear normal appearance, external nose normal appearing. Mouth/Throat: Oropharynx is clear and moist, able to handle oral secretions w/out difficulty, airway patent  NECK: Supple. Normal ROM, No lymphadenopathy. Trachea is midline. No bruising, swelling or deformity  RESP: Clear to auscultation bilaterally. No wheezes, rales, rhonchi. Normal effort and breath sounds. No respiratory distress  CARDIO: Normal rate, regular rhythm and normal heart sounds. No MGR. ABDOMEN: Soft, non-tender, non-distended, normoactive bowel sounds in all four quadrants. There is no tenderness. There is no rebound and no guarding. BACK: No CVA or spinal tenderness  BREAST:  Deferred  PELVIC:    Deferred   RECTAL:  Deferred   :           Deferred  EXTREMITIES: EXAMINATION OF:  Right FOOT/ANKLE  Integumentary: No rashes, skin patches, wounds, or abrasions to the right or left legs                             Warm and normal color. No regions of expressible drainage.                           Moderate swelling to the dorsal forefoot. No wrinkle test at this time due to swelling. Gait: nonambulatory      Tenderness: ATFL/CFL Anterolateral ankle ligaments tenderness is not present                              Anterior Syndesmosis is not present                              Medial deltoid ligament tenderness is not present                              Peroneal tendon sheath no swelling                               1/2 Metatarsal base tenderness is present                              Midfoot tenderness is present                               Achilles tenderness is not present                               Cuboid tenderness is not present                               Proximal fibula tenderness: is not present       Motor Strength/Tone Exam: Normal to the toes with respect to extension/flexion      Sensory Exam:   Intact Normal Sensation to ankle/foot      Stability Testing: Peroneal tendon instability : Not tested due to pain,              Instability of Ankle/Subtalar: Not tested due to pain,         ROM: Decreased ROM noted to ankle                 Decreased Hindfoot (Subtalar, CC, TN regions)                   Normal Forefoot toes        Contractures: No Achilles or Gastrocnemius Contractures      Calf tenderness: Absent for calf or gastrocnemius muscle regions       Soft, supple, non tender, non taut lower extremity compartments       Alignment: Neutral Hindfoot  Wounds/Abrasions:   None present  Extremities:   No embolic phenomena to the toes or hands                          No significant edema to the foot and or toes.                           Lower extremities are warm and appear well perfused                          DVT: No evidence of DVT seen on examination at this time                          No calf swelling, no tenderness to calf muscles  Lymphatic:  No Evidence of Lymphedema  Vascular: Medial Border of Tibia Region: Edema is not present                   Pulses: Dorsalis Pedis &  Posterior Tibial Pulses : Palpable yes                   Varicosities Lower Limbs :  None    Neuro: Negative bilateral Straight leg raise (seated position)               See Musculoskeletal section for pertinent individual extremity examination               No abnormal hand/wrist, foot/ankle, or facial/neck tremors. RADIOGRAPHS & DIAGNOSTIC STUDIES:     X-rays of the right foot reveal a fracture to the first metatarsal, slight healing noted. Alignment is acceptable. LABS:     Pending    ASSESSMENT:       Encounter Diagnosis   Name Primary?  Right foot pain Yes       PLAN:     Again, the alternatives, risks, and complications, as well as expected outcome were discussed. Patients surgery was initially scheduled for 12/14/18, and was cancelled due to +UDS. Patients x-rays completed in the office today show signs of healing. If alignment is maintained and if healing continues, we will continue to treat fracture non-operatively. We will have patient return on 1/3/19 for repeat x-rays and make final determination at that time. Otherwise, we will proceed with ORIF surgery on 1/4/19.     Adonis Joshi PA-C  12/19/2018  9:24 AM

## 2018-12-19 NOTE — PROGRESS NOTES
1. Have you been to the ER, urgent care clinic since your last visit? Hospitalized since your last visit? No    2. Have you seen or consulted any other health care providers outside of the [address] [place] [address] since your last visit? Include any pap smears or colon screening.  No

## 2018-12-19 NOTE — PROCEDURES
X-rays of the right foot reveal a fracture to the first metatarsal, slight healing noted. Alignment is acceptable.

## 2019-01-08 ENCOUNTER — DOCUMENTATION ONLY (OUTPATIENT)
Dept: ORTHOPEDIC SURGERY | Age: 34
End: 2019-01-08

## 2019-01-08 ENCOUNTER — OFFICE VISIT (OUTPATIENT)
Dept: ORTHOPEDIC SURGERY | Age: 34
End: 2019-01-08

## 2019-01-08 VITALS
DIASTOLIC BLOOD PRESSURE: 96 MMHG | TEMPERATURE: 98.5 F | HEIGHT: 74 IN | BODY MASS INDEX: 28.11 KG/M2 | RESPIRATION RATE: 16 BRPM | WEIGHT: 219 LBS | HEART RATE: 111 BPM | SYSTOLIC BLOOD PRESSURE: 149 MMHG | OXYGEN SATURATION: 96 %

## 2019-01-08 DIAGNOSIS — Z01.818 PRE-OPERATIVE EXAMINATION: ICD-10-CM

## 2019-01-08 DIAGNOSIS — S92.311D CLOSED DISPLACED FRACTURE OF FIRST METATARSAL BONE OF RIGHT FOOT WITH ROUTINE HEALING, SUBSEQUENT ENCOUNTER: Primary | ICD-10-CM

## 2019-01-08 NOTE — PROGRESS NOTES
1. Have you been to the ER, urgent care clinic since your last visit? Hospitalized since your last visit? No 
 
2. Have you seen or consulted any other health care providers outside of the [address] [place] [address] since your last visit? Include any pap smears or colon screening.  No

## 2019-01-08 NOTE — PROGRESS NOTES
HISTORY:  
 
The patient was seen in the office today for follow up for a right first metatarsal fracture. Patient presents to the office today for repeat x-rays. X-rays were performed in the office today which reveal good healing. As such, no surgical fixation is warranted at this time. We will allow fracture to continue healing non-operatively. Patients surgery was initially scheduled for 12/14/18, and was cancelled due to +UDS. Patients x-rays completed in the office today show signs of continued healing. Since the alignment is well maintained and healing continues, we will continue to treat fracture non-operatively. Patient understands and agrees with the treatment plan. PAST MEDICAL HISTORY:  
 
Past Medical History:  
Diagnosis Date  Body piercing Tongue  Cough CURRENT MEDICATIONS:  
 
Current Outpatient Medications Medication Sig Dispense Refill  oxyCODONE-acetaminophen (PERCOCET) 5-325 mg per tablet Take 1 Tab by mouth three (3) times daily as needed for Pain. Max Daily Amount: 3 Tabs. 40 Tab 0  ibuprofen (MOTRIN) 800 mg tablet Take  by mouth.  traMADol (ULTRAM) 50 mg tablet Take 1 Tab by mouth every six (6) hours as needed for Pain. Max Daily Amount: 200 mg. 48 Tab 0 ALLERGIES:  
 
Allergies Allergen Reactions  Vicodin [Hydrocodone-Acetaminophen] Rash SURGICAL HISTORY:  
 
Past Surgical History:  
Procedure Laterality Date  HX OTHER SURGICAL Right   
 closure of right knee laceration as child SOCIAL HISTORY:  
 
Social History Socioeconomic History  Marital status: UNKNOWN Spouse name: Not on file  Number of children: Not on file  Years of education: Not on file  Highest education level: Not on file Tobacco Use  Smoking status: Current Every Day Smoker Packs/day: 0.25  Smokeless tobacco: Never Used Substance and Sexual Activity  Drug use: Yes Types: Cocaine, Marijuana Comment: +UDS 12/12/2018 FAMILY HISTORY:  
 
History reviewed. No pertinent family history. REVIEW OF SYSTEMS:  
 
Negative for fevers, chills, chest pain, shortness of breath, weight loss, recent illness General: Negative for fever and chills. No unexpected change in weight. Denies fatigue. No change in appetite. Skin: Negative for rash or itching. HEENT: Negative for congestion, sore throat, neck pain and neck stiffness. No change in vision or hearing. Hasn't noted any enlarged lymph nodes in the neck. Cardiovascular:  Negative for chest pain and palpitations. Has not noted pedal edema. Respiratory: Negative for colds, sinus, hemoptysis, shortness of breath and wheezing. Positive for cough. Gastrointestinal: Negative for nausea and vomiting, rectal bleeding, coffee ground emesis, abdominal pain, diarrhea and constipation. Genitourinary: Negative for dysuria, frequency urgency, or burning on micturition. No flank pain, no foul smelling urine, no difficulty with initiating urination. Hematological: Negative for bleeding or easy bruising. Musculoskeletal: Negative  for arthralgias, back pain or neck pain. Neurological: Negative for dizziness, seizures or syncopal episodes. Denies headaches. Endocrine: Denies excessive thirst.  No heat/cold intolerance. Psychiatric: Negative for depression or insomnia. PHYSICAL EXAMINATION:  
 
VITALS:  
Visit Vitals BP (!) 149/96 Pulse (!) 111 Temp 98.5 °F (36.9 °C) (Oral) Resp 16 Ht 6' 2\" (1.88 m) Wt 219 lb (99.3 kg) SpO2 96% BMI 28.12 kg/m² GEN:  Well developed, well nourished 34 y.o. male in no acute distress. PSYCH: Alert an oriented to person, place and time. Mood, memory, affect, behavior and judgment normal 
HEENT: Normocephalic and atraumatic. Eyes: Conjunctivae and EOM are normal.Pupils are equal, round, and reactive to light. External ear normal appearance, external nose normal appearing.  Mouth/Throat: Oropharynx is clear and moist, able to handle oral secretions w/out difficulty, airway patent NECK: Supple. Normal ROM, No lymphadenopathy. Trachea is midline. No bruising, swelling or deformity RESP: Clear to auscultation bilaterally. No wheezes, rales, rhonchi. Normal effort and breath sounds. No respiratory distress CARDIO: Normal rate, regular rhythm and normal heart sounds. No MGR. ABDOMEN: Soft, non-tender, non-distended, normoactive bowel sounds in all four quadrants. There is no tenderness. There is no rebound and no guarding. BACK: No CVA or spinal tenderness BREAST:  Deferred PELVIC:    Deferred RECTAL:  Deferred :           Deferred EXTREMITIES: EXAMINATION OF:  Right FOOT/ANKLE Integumentary: No rashes, skin patches, wounds, or abrasions to the right or left legs Warm and normal color. No regions of expressible drainage. Moderate swelling to the dorsal forefoot. No wrinkle test at this time due to swelling. Gait: nonambulatory Tenderness: ATFL/CFL Anterolateral ankle ligaments tenderness is not present Anterior Syndesmosis is not present Medial deltoid ligament tenderness is not present Peroneal tendon sheath no swelling 1/2 Metatarsal base tenderness is present Midfoot tenderness is present Achilles tenderness is not present Cuboid tenderness is not present Proximal fibula tenderness: is not present Motor Strength/Tone Exam: Normal to the toes with respect to extension/flexion Sensory Exam:   Intact Normal Sensation to ankle/foot     Stability Testing: Peroneal tendon instability : Not tested due to pain, 
 Instability of Ankle/Subtalar: Not tested due to pain, ROM: Decreased ROM noted to ankle Decreased Hindfoot (Subtalar, CC, TN regions) Normal Forefoot toes Contractures: No Achilles or Gastrocnemius Contractures Calf tenderness: Absent for calf or gastrocnemius muscle regions Soft, supple, non tender, non taut lower extremity compartments Alignment: Neutral Hindfoot Wounds/Abrasions:   None present Extremities:   No embolic phenomena to the toes or hands No significant edema to the foot and or toes. Lower extremities are warm and appear well perfused DVT: No evidence of DVT seen on examination at this time No calf swelling, no tenderness to calf muscles Lymphatic:  No Evidence of Lymphedema Vascular: Medial Border of Tibia Region: Edema is not present Pulses: Dorsalis Pedis &  Posterior Tibial Pulses : Palpable yes Varicosities Lower Limbs :  None Neuro: Negative bilateral Straight leg raise (seated position) See Musculoskeletal section for pertinent individual extremity examination No abnormal hand/wrist, foot/ankle, or facial/neck tremors. RADIOGRAPHS & DIAGNOSTIC STUDIES:  
 
X-rays of the right foot reveal a fracture to the first metatarsal, continued healing noted. Alignment is acceptable. LABS:  
Pending ASSESSMENT:  
 
 
Encounter Diagnoses Name Primary?  Closed displaced fracture of first metatarsal bone of right foot with routine healing, subsequent encounter Yes  Pre-operative examination PLAN:  
 
 
Continue NO WEIGHT on the RIGHT LOWER EXTREMITY Use crutches or knee roller for ambulating Take a Calcium and Vitamin D supplement and maintain good nutrition Follow up in 4 weeks or sooner as needed Cresencio Jeans, PA-C 
 1/8/2019 
9:24 AM

## 2019-01-08 NOTE — PATIENT INSTRUCTIONS
Continue NO WEIGHT on the RIGHT LOWER EXTREMITY Use crutches or knee roller for ambulating Take a Calcium and Vitamin D supplement and maintain good nutrition Follow up in 4 weeks or sooner as needed Eating Healthy Foods: Care Instructions Your Care Instructions Eating healthy foods can help lower your risk for disease. Healthy food gives you energy and keeps your heart strong, your brain active, your muscles working, and your bones strong. A healthy diet includes a variety of foods from the basic food groups: grains, vegetables, fruits, milk and milk products, and meat and beans. Some people may eat more of their favorite foods from only one food group and, as a result, miss getting the nutrients they need. So, it is important to pay attention not only to what you eat but also to what you are missing from your diet. You can eat a healthy, balanced diet by making a few small changes. Follow-up care is a key part of your treatment and safety. Be sure to make and go to all appointments, and call your doctor if you are having problems. It's also a good idea to know your test results and keep a list of the medicines you take. How can you care for yourself at home? Look at what you eat · Keep a food diary for a week or two and record everything you eat or drink. Track the number of servings you eat from each food group. · For a balanced diet every day, eat a variety of: 
? 6 or more ounce-equivalents of grains, such as cereals, breads, crackers, rice, or pasta, every day. An ounce-equivalent is 1 slice of bread, 1 cup of ready-to-eat cereal, or ½ cup of cooked rice, cooked pasta, or cooked cereal. 
? 2½ cups of vegetables, especially: § Dark-green vegetables such as broccoli and spinach. § Orange vegetables such as carrots and sweet potatoes. § Dry beans (such as sanchez and kidney beans) and peas (such as lentils). ? 2 cups of fresh, frozen, or canned fruit. A small apple or 1 banana or orange equals 1 cup. ? 3 cups of nonfat or low-fat milk, yogurt, or other milk products. ? 5½ ounces of meat and beans, such as chicken, fish, lean meat, beans, nuts, and seeds. One egg, 1 tablespoon of peanut butter, ½ ounce nuts or seeds, or ¼ cup of cooked beans equals 1 ounce of meat. · Learn how to read food labels for serving sizes and ingredients. Fast-food and convenience-food meals often contain few or no fruits or vegetables. Make sure you eat some fruits and vegetables to make the meal more nutritious. · Look at your food diary. For each food group, add up what you have eaten and then divide the total by the number of days. This will give you an idea of how much you are eating from each food group. See if you can find some ways to change your diet to make it more healthy. Start small · Do not try to make dramatic changes to your diet all at once. You might feel that you are missing out on your favorite foods and then be more likely to fail. · Start slowly, and gradually change your habits. Try some of the following: ? Use whole wheat bread instead of white bread. ? Use nonfat or low-fat milk instead of whole milk. ? Eat brown rice instead of white rice, and eat whole wheat pasta instead of white-flour pasta. ? Try low-fat cheeses and low-fat yogurt. ? Add more fruits and vegetables to meals and have them for snacks. ? Add lettuce, tomato, cucumber, and onion to sandwiches. ? Add fruit to yogurt and cereal. 
Enjoy food · You can still eat your favorite foods. You just may need to eat less of them. If your favorite foods are high in fat, salt, and sugar, limit how often you eat them, but do not cut them out entirely. · Eat a wide variety of foods. Make healthy choices when eating out · The type of restaurant you choose can help you make healthy choices. Even fast-food chains are now offering more low-fat or healthier choices on the menu. · Choose smaller portions, or take half of your meal home. · When eating out, try: ? A veggie pizza with a whole wheat crust or grilled chicken (instead of sausage or pepperoni). ? Pasta with roasted vegetables, grilled chicken, or marinara sauce instead of cream sauce. ? A vegetable wrap or grilled chicken wrap. ? Broiled or poached food instead of fried or breaded items. Make healthy choices easy · Buy packaged, prewashed, ready-to-eat fresh vegetables and fruits, such as baby carrots, salad mixes, and chopped or shredded broccoli and cauliflower. · Buy packaged, presliced fruits, such as melon or pineapple. · Choose 100% fruit or vegetable juice instead of soda. Limit juice intake to 4 to 6 oz (½ to ¾ cup) a day. · Blend low-fat yogurt, fruit juice, and canned or frozen fruit to make a smoothie for breakfast or a snack. Where can you learn more? Go to http://bertin-anna.info/. Enter T756 in the search box to learn more about \"Eating Healthy Foods: Care Instructions. \" Current as of: March 29, 2018 Content Version: 11.8 © 7352-1435 [employer]. Care instructions adapted under license by [employer] (which disclaims liability or warranty for this information). If you have questions about a medical condition or this instruction, always ask your healthcare professional. [place] [address] any warranty or liability for your use of this information.

## 2019-01-09 NOTE — PROGRESS NOTES
[facility]        Plan to treat Rajni Mckeon with out incisional surgery as he is :    1. Healing his fx  2.   Unchanged alignment of his fracture    Lyudmila Schaefer MD  1/8/2019  8 AM

## 2019-01-09 NOTE — PROGRESS NOTES
[facility]    Encounter Diagnosis   Name Primary?  Closed displaced fracture of first metatarsal bone of right foot with routine healing, subsequent encounter Yes      1/8/2019    Orders Placed This Encounter    [place] CLOSED [address]             Plan to treat Susana Moss with out incisional surgery as he is :     1. Healing his fx  2.   Unchanged alignment of his fracture     Anastasiia Alan MD  1/8/2019  8 AM

## 2019-02-13 NOTE — PROGRESS NOTES
Dr. Jose E Wilson has cancelled his surgery patient is healing on his own. Patient will continue to follow up in office.

## 2025-05-14 NOTE — PROCEDURES
X-rays of the right foot reveal a fracture to the first metatarsal, continued healing noted. Alignment is acceptable. Patient tolerated procedure well.